# Patient Record
Sex: MALE | Race: WHITE | NOT HISPANIC OR LATINO | Employment: OTHER | URBAN - METROPOLITAN AREA
[De-identification: names, ages, dates, MRNs, and addresses within clinical notes are randomized per-mention and may not be internally consistent; named-entity substitution may affect disease eponyms.]

---

## 2020-02-07 DIAGNOSIS — M25.531 PAIN IN RIGHT WRIST: Primary | ICD-10-CM

## 2020-07-21 ENCOUNTER — HOSPITAL ENCOUNTER (OUTPATIENT)
Facility: HOSPITAL | Age: 20
Discharge: HOME | End: 2020-07-21
Attending: EMERGENCY MEDICINE | Admitting: UROLOGY
Payer: OTHER GOVERNMENT

## 2020-07-21 ENCOUNTER — ANESTHESIA (OUTPATIENT)
Dept: SURGERY | Facility: HOSPITAL | Age: 20
End: 2020-07-21
Payer: OTHER GOVERNMENT

## 2020-07-21 ENCOUNTER — ANESTHESIA EVENT (OUTPATIENT)
Dept: SURGERY | Facility: HOSPITAL | Age: 20
End: 2020-07-21
Payer: OTHER GOVERNMENT

## 2020-07-21 VITALS
TEMPERATURE: 99 F | HEART RATE: 66 BPM | BODY MASS INDEX: 26.66 KG/M2 | HEIGHT: 69 IN | RESPIRATION RATE: 16 BRPM | SYSTOLIC BLOOD PRESSURE: 139 MMHG | WEIGHT: 180 LBS | DIASTOLIC BLOOD PRESSURE: 78 MMHG | OXYGEN SATURATION: 100 %

## 2020-07-21 DIAGNOSIS — N44.00 TESTICULAR TORSION: ICD-10-CM

## 2020-07-21 DIAGNOSIS — N44.00 LEFT TESTICULAR TORSION: Primary | ICD-10-CM

## 2020-07-21 DIAGNOSIS — R07.9 CHEST PAIN: ICD-10-CM

## 2020-07-21 DIAGNOSIS — N50.812 TESTICULAR PAIN, LEFT: ICD-10-CM

## 2020-07-21 LAB
ABO + RH BLD: NORMAL
ALBUMIN SERPL BCP-MCNC: 4.6 G/DL (ref 3.5–5.2)
ALP SERPL-CCNC: 77 U/L (ref 55–135)
ALT SERPL W/O P-5'-P-CCNC: 102 U/L (ref 10–44)
ANION GAP SERPL CALC-SCNC: 15 MMOL/L (ref 8–16)
APTT BLDCRRT: 24.8 SEC (ref 21–32)
AST SERPL-CCNC: 54 U/L (ref 10–40)
BASOPHILS # BLD AUTO: 0.03 K/UL (ref 0–0.2)
BASOPHILS NFR BLD: 0 % (ref 0–1.9)
BASOPHILS NFR BLD: 0.3 % (ref 0–1.9)
BILIRUB SERPL-MCNC: 1 MG/DL (ref 0.1–1)
BLD GP AB SCN CELLS X3 SERPL QL: NORMAL
BUN SERPL-MCNC: 17 MG/DL (ref 6–20)
CALCIUM SERPL-MCNC: 10.1 MG/DL (ref 8.7–10.5)
CHLORIDE SERPL-SCNC: 106 MMOL/L (ref 95–110)
CO2 SERPL-SCNC: 17 MMOL/L (ref 23–29)
CREAT SERPL-MCNC: 1.1 MG/DL (ref 0.5–1.4)
DIFFERENTIAL METHOD: ABNORMAL
DIFFERENTIAL METHOD: ABNORMAL
EOSINOPHIL # BLD AUTO: 0 K/UL (ref 0–0.5)
EOSINOPHIL NFR BLD: 0.2 % (ref 0–8)
EOSINOPHIL NFR BLD: 100 % (ref 0–8)
ERYTHROCYTE [DISTWIDTH] IN BLOOD BY AUTOMATED COUNT: 11.9 % (ref 11.5–14.5)
ERYTHROCYTE [DISTWIDTH] IN BLOOD BY AUTOMATED COUNT: 11.9 % (ref 11.5–14.5)
EST. GFR  (AFRICAN AMERICAN): >60 ML/MIN/1.73 M^2
EST. GFR  (NON AFRICAN AMERICAN): >60 ML/MIN/1.73 M^2
GLUCOSE SERPL-MCNC: 110 MG/DL (ref 70–110)
HCT VFR BLD AUTO: 43.6 % (ref 40–54)
HCT VFR BLD AUTO: 45 % (ref 40–54)
HGB BLD-MCNC: 15.3 G/DL (ref 14–18)
HGB BLD-MCNC: 15.6 G/DL (ref 14–18)
IMM GRANULOCYTES # BLD AUTO: 0.04 K/UL (ref 0–0.04)
IMM GRANULOCYTES # BLD AUTO: ABNORMAL K/UL (ref 0–0.04)
IMM GRANULOCYTES NFR BLD AUTO: 0.3 % (ref 0–0.5)
IMM GRANULOCYTES NFR BLD AUTO: ABNORMAL % (ref 0–0.5)
INR PPP: 1 (ref 0.8–1.2)
LACTATE SERPL-SCNC: 1.1 MMOL/L (ref 0.5–2.2)
LIPASE SERPL-CCNC: 12 U/L (ref 4–60)
LYMPHOCYTES # BLD AUTO: 0.8 K/UL (ref 1–4.8)
LYMPHOCYTES NFR BLD: 0 % (ref 18–48)
LYMPHOCYTES NFR BLD: 6.8 % (ref 18–48)
MCH RBC QN AUTO: 31.6 PG (ref 27–31)
MCH RBC QN AUTO: 31.9 PG (ref 27–31)
MCHC RBC AUTO-ENTMCNC: 34.7 G/DL (ref 32–36)
MCHC RBC AUTO-ENTMCNC: 35.1 G/DL (ref 32–36)
MCV RBC AUTO: 91 FL (ref 82–98)
MCV RBC AUTO: 91 FL (ref 82–98)
MONOCYTES # BLD AUTO: 0.8 K/UL (ref 0.3–1)
MONOCYTES NFR BLD: 0 % (ref 4–15)
MONOCYTES NFR BLD: 6.9 % (ref 4–15)
NEUTROPHILS # BLD AUTO: 10.1 K/UL (ref 1.8–7.7)
NEUTROPHILS NFR BLD: 0 % (ref 38–73)
NEUTROPHILS NFR BLD: 85.5 % (ref 38–73)
NRBC BLD-RTO: 0 /100 WBC
NRBC BLD-RTO: 0 /100 WBC
PLATELET # BLD AUTO: 235 K/UL (ref 150–350)
PLATELET # BLD AUTO: ABNORMAL K/UL (ref 150–350)
PMV BLD AUTO: 10.5 FL (ref 9.2–12.9)
PMV BLD AUTO: ABNORMAL FL (ref 9.2–12.9)
POTASSIUM SERPL-SCNC: 3.4 MMOL/L (ref 3.5–5.1)
PROT SERPL-MCNC: 7.9 G/DL (ref 6–8.4)
PROTHROMBIN TIME: 10.7 SEC (ref 9–12.5)
RBC # BLD AUTO: 4.8 M/UL (ref 4.6–6.2)
RBC # BLD AUTO: 4.93 M/UL (ref 4.6–6.2)
RH BLD: NORMAL
SARS-COV-2 RDRP RESP QL NAA+PROBE: NEGATIVE
SODIUM SERPL-SCNC: 138 MMOL/L (ref 136–145)
WBC # BLD AUTO: 11.81 K/UL (ref 3.9–12.7)
WBC # BLD AUTO: 7.37 K/UL (ref 3.9–12.7)

## 2020-07-21 PROCEDURE — 63600175 PHARM REV CODE 636 W HCPCS: Performed by: ANESTHESIOLOGY

## 2020-07-21 PROCEDURE — 86850 RBC ANTIBODY SCREEN: CPT

## 2020-07-21 PROCEDURE — 96375 TX/PRO/DX INJ NEW DRUG ADDON: CPT

## 2020-07-21 PROCEDURE — 00930 ANES PX MALE GENT ORCHIOPEXY: CPT | Performed by: UROLOGY

## 2020-07-21 PROCEDURE — 25000003 PHARM REV CODE 250: Performed by: NURSE ANESTHETIST, CERTIFIED REGISTERED

## 2020-07-21 PROCEDURE — 54620 SUSPENSION OF TESTIS: CPT | Mod: 50,,, | Performed by: UROLOGY

## 2020-07-21 PROCEDURE — 36000706: Performed by: UROLOGY

## 2020-07-21 PROCEDURE — 80053 COMPREHEN METABOLIC PANEL: CPT

## 2020-07-21 PROCEDURE — D9220A PRA ANESTHESIA: Mod: ANES,,, | Performed by: ANESTHESIOLOGY

## 2020-07-21 PROCEDURE — D9220A PRA ANESTHESIA: ICD-10-PCS | Mod: ANES,,, | Performed by: ANESTHESIOLOGY

## 2020-07-21 PROCEDURE — 96376 TX/PRO/DX INJ SAME DRUG ADON: CPT

## 2020-07-21 PROCEDURE — 25000003 PHARM REV CODE 250: Performed by: PHYSICIAN ASSISTANT

## 2020-07-21 PROCEDURE — 87040 BLOOD CULTURE FOR BACTERIA: CPT | Mod: 59

## 2020-07-21 PROCEDURE — U0002 COVID-19 LAB TEST NON-CDC: HCPCS

## 2020-07-21 PROCEDURE — 99285 EMERGENCY DEPT VISIT HI MDM: CPT | Mod: ,,, | Performed by: UROLOGY

## 2020-07-21 PROCEDURE — 25000003 PHARM REV CODE 250: Performed by: UROLOGY

## 2020-07-21 PROCEDURE — 54620 PR FIXATN OF TESTIS OPP TORSN: ICD-10-PCS | Mod: 50,,, | Performed by: UROLOGY

## 2020-07-21 PROCEDURE — 36000707: Performed by: UROLOGY

## 2020-07-21 PROCEDURE — 83690 ASSAY OF LIPASE: CPT

## 2020-07-21 PROCEDURE — 85730 THROMBOPLASTIN TIME PARTIAL: CPT

## 2020-07-21 PROCEDURE — 83605 ASSAY OF LACTIC ACID: CPT

## 2020-07-21 PROCEDURE — 71000016 HC POSTOP RECOV ADDL HR: Performed by: UROLOGY

## 2020-07-21 PROCEDURE — 37000008 HC ANESTHESIA 1ST 15 MINUTES: Performed by: UROLOGY

## 2020-07-21 PROCEDURE — 71000033 HC RECOVERY, INTIAL HOUR: Performed by: UROLOGY

## 2020-07-21 PROCEDURE — 99285 EMERGENCY DEPT VISIT HI MDM: CPT | Mod: 25

## 2020-07-21 PROCEDURE — 86901 BLOOD TYPING SEROLOGIC RH(D): CPT

## 2020-07-21 PROCEDURE — 63600175 PHARM REV CODE 636 W HCPCS: Performed by: UROLOGY

## 2020-07-21 PROCEDURE — 85025 COMPLETE CBC W/AUTO DIFF WBC: CPT | Mod: 91

## 2020-07-21 PROCEDURE — 96374 THER/PROPH/DIAG INJ IV PUSH: CPT

## 2020-07-21 PROCEDURE — 71000015 HC POSTOP RECOV 1ST HR: Performed by: UROLOGY

## 2020-07-21 PROCEDURE — 85610 PROTHROMBIN TIME: CPT

## 2020-07-21 PROCEDURE — 63600175 PHARM REV CODE 636 W HCPCS: Performed by: NURSE ANESTHETIST, CERTIFIED REGISTERED

## 2020-07-21 PROCEDURE — 37000009 HC ANESTHESIA EA ADD 15 MINS: Performed by: UROLOGY

## 2020-07-21 PROCEDURE — 99284 EMERGENCY DEPT VISIT MOD MDM: CPT | Mod: 25

## 2020-07-21 PROCEDURE — 63600175 PHARM REV CODE 636 W HCPCS: Performed by: PHYSICIAN ASSISTANT

## 2020-07-21 PROCEDURE — D9220A PRA ANESTHESIA: ICD-10-PCS | Mod: CRNA,,, | Performed by: NURSE ANESTHETIST, CERTIFIED REGISTERED

## 2020-07-21 PROCEDURE — 99285 PR EMERGENCY DEPT VISIT,LEVEL V: ICD-10-PCS | Mod: ,,, | Performed by: UROLOGY

## 2020-07-21 PROCEDURE — D9220A PRA ANESTHESIA: Mod: CRNA,,, | Performed by: NURSE ANESTHETIST, CERTIFIED REGISTERED

## 2020-07-21 RX ORDER — HYDROCODONE BITARTRATE AND ACETAMINOPHEN 10; 325 MG/1; MG/1
1 TABLET ORAL EVERY 4 HOURS PRN
Status: DISCONTINUED | OUTPATIENT
Start: 2020-07-21 | End: 2020-07-21 | Stop reason: HOSPADM

## 2020-07-21 RX ORDER — HYDROCODONE BITARTRATE AND ACETAMINOPHEN 5; 325 MG/1; MG/1
1 TABLET ORAL EVERY 4 HOURS PRN
Status: DISCONTINUED | OUTPATIENT
Start: 2020-07-21 | End: 2020-07-21 | Stop reason: HOSPADM

## 2020-07-21 RX ORDER — SODIUM CHLORIDE, SODIUM LACTATE, POTASSIUM CHLORIDE, CALCIUM CHLORIDE 600; 310; 30; 20 MG/100ML; MG/100ML; MG/100ML; MG/100ML
INJECTION, SOLUTION INTRAVENOUS CONTINUOUS PRN
Status: DISCONTINUED | OUTPATIENT
Start: 2020-07-21 | End: 2020-07-21

## 2020-07-21 RX ORDER — HYDROMORPHONE HYDROCHLORIDE 2 MG/ML
0.2 INJECTION, SOLUTION INTRAMUSCULAR; INTRAVENOUS; SUBCUTANEOUS EVERY 5 MIN PRN
Status: DISCONTINUED | OUTPATIENT
Start: 2020-07-21 | End: 2020-07-21 | Stop reason: HOSPADM

## 2020-07-21 RX ORDER — MORPHINE SULFATE 10 MG/ML
4 INJECTION INTRAMUSCULAR; INTRAVENOUS; SUBCUTANEOUS
Status: COMPLETED | OUTPATIENT
Start: 2020-07-21 | End: 2020-07-21

## 2020-07-21 RX ORDER — HYDROCODONE BITARTRATE AND ACETAMINOPHEN 5; 325 MG/1; MG/1
1 TABLET ORAL EVERY 6 HOURS PRN
Qty: 28 TABLET | Refills: 0 | Status: SHIPPED | OUTPATIENT
Start: 2020-07-21

## 2020-07-21 RX ORDER — SODIUM CHLORIDE, SODIUM LACTATE, POTASSIUM CHLORIDE, CALCIUM CHLORIDE 600; 310; 30; 20 MG/100ML; MG/100ML; MG/100ML; MG/100ML
INJECTION, SOLUTION INTRAVENOUS CONTINUOUS
Status: DISCONTINUED | OUTPATIENT
Start: 2020-07-21 | End: 2020-07-21 | Stop reason: HOSPADM

## 2020-07-21 RX ORDER — PROPOFOL 10 MG/ML
VIAL (ML) INTRAVENOUS
Status: DISCONTINUED | OUTPATIENT
Start: 2020-07-21 | End: 2020-07-21

## 2020-07-21 RX ORDER — SODIUM CHLORIDE 0.9 % (FLUSH) 0.9 %
10 SYRINGE (ML) INJECTION
Status: DISCONTINUED | OUTPATIENT
Start: 2020-07-21 | End: 2020-07-21 | Stop reason: HOSPADM

## 2020-07-21 RX ORDER — LIDOCAINE HCL/PF 100 MG/5ML
SYRINGE (ML) INTRAVENOUS
Status: DISCONTINUED | OUTPATIENT
Start: 2020-07-21 | End: 2020-07-21

## 2020-07-21 RX ORDER — ONDANSETRON 2 MG/ML
4 INJECTION INTRAMUSCULAR; INTRAVENOUS
Status: COMPLETED | OUTPATIENT
Start: 2020-07-21 | End: 2020-07-21

## 2020-07-21 RX ORDER — CEFAZOLIN SODIUM 1 G/50ML
1 SOLUTION INTRAVENOUS
Status: DISCONTINUED | OUTPATIENT
Start: 2020-07-21 | End: 2020-07-21 | Stop reason: HOSPADM

## 2020-07-21 RX ORDER — MIDAZOLAM HYDROCHLORIDE 1 MG/ML
INJECTION, SOLUTION INTRAMUSCULAR; INTRAVENOUS
Status: DISCONTINUED | OUTPATIENT
Start: 2020-07-21 | End: 2020-07-21

## 2020-07-21 RX ORDER — MORPHINE SULFATE 10 MG/ML
2 INJECTION INTRAMUSCULAR; INTRAVENOUS; SUBCUTANEOUS
Status: COMPLETED | OUTPATIENT
Start: 2020-07-21 | End: 2020-07-21

## 2020-07-21 RX ORDER — CEPHALEXIN 500 MG/1
500 CAPSULE ORAL EVERY 8 HOURS
Qty: 15 CAPSULE | Refills: 0 | Status: SHIPPED | OUTPATIENT
Start: 2020-07-21 | End: 2020-07-26

## 2020-07-21 RX ORDER — ONDANSETRON 2 MG/ML
INJECTION INTRAMUSCULAR; INTRAVENOUS
Status: DISCONTINUED | OUTPATIENT
Start: 2020-07-21 | End: 2020-07-21

## 2020-07-21 RX ORDER — ACETAMINOPHEN 10 MG/ML
1000 INJECTION, SOLUTION INTRAVENOUS ONCE
Status: COMPLETED | OUTPATIENT
Start: 2020-07-21 | End: 2020-07-21

## 2020-07-21 RX ORDER — SUCCINYLCHOLINE CHLORIDE 20 MG/ML
INJECTION INTRAMUSCULAR; INTRAVENOUS
Status: DISCONTINUED | OUTPATIENT
Start: 2020-07-21 | End: 2020-07-21

## 2020-07-21 RX ORDER — KETOROLAC TROMETHAMINE 30 MG/ML
15 INJECTION, SOLUTION INTRAMUSCULAR; INTRAVENOUS
Status: COMPLETED | OUTPATIENT
Start: 2020-07-21 | End: 2020-07-21

## 2020-07-21 RX ORDER — LIDOCAINE HYDROCHLORIDE 10 MG/ML
INJECTION, SOLUTION EPIDURAL; INFILTRATION; INTRACAUDAL; PERINEURAL
Status: DISCONTINUED | OUTPATIENT
Start: 2020-07-21 | End: 2020-07-21 | Stop reason: HOSPADM

## 2020-07-21 RX ORDER — BUPIVACAINE HYDROCHLORIDE 2.5 MG/ML
INJECTION, SOLUTION EPIDURAL; INFILTRATION; INTRACAUDAL
Status: DISCONTINUED | OUTPATIENT
Start: 2020-07-21 | End: 2020-07-21 | Stop reason: HOSPADM

## 2020-07-21 RX ORDER — PHENAZOPYRIDINE HYDROCHLORIDE 100 MG/1
200 TABLET, FILM COATED ORAL ONCE
Status: COMPLETED | OUTPATIENT
Start: 2020-07-21 | End: 2020-07-21

## 2020-07-21 RX ORDER — FENTANYL CITRATE 50 UG/ML
INJECTION, SOLUTION INTRAMUSCULAR; INTRAVENOUS
Status: DISCONTINUED | OUTPATIENT
Start: 2020-07-21 | End: 2020-07-21

## 2020-07-21 RX ADMIN — HYDROMORPHONE HYDROCHLORIDE 0.2 MG: 2 INJECTION, SOLUTION INTRAMUSCULAR; INTRAVENOUS; SUBCUTANEOUS at 11:07

## 2020-07-21 RX ADMIN — HYDROCODONE BITARTRATE AND ACETAMINOPHEN 1 TABLET: 5; 325 TABLET ORAL at 11:07

## 2020-07-21 RX ADMIN — MIDAZOLAM HYDROCHLORIDE 2 MG: 1 INJECTION, SOLUTION INTRAMUSCULAR; INTRAVENOUS at 09:07

## 2020-07-21 RX ADMIN — MORPHINE SULFATE 4 MG: 10 INJECTION INTRAVENOUS at 07:07

## 2020-07-21 RX ADMIN — ACETAMINOPHEN 1000 MG: 10 INJECTION, SOLUTION INTRAVENOUS at 10:07

## 2020-07-21 RX ADMIN — MORPHINE SULFATE 2 MG: 10 INJECTION INTRAVENOUS at 08:07

## 2020-07-21 RX ADMIN — PHENAZOPYRIDINE HYDROCHLORIDE 200 MG: 100 TABLET ORAL at 10:07

## 2020-07-21 RX ADMIN — FENTANYL CITRATE 50 MCG: 50 INJECTION INTRAMUSCULAR; INTRAVENOUS at 09:07

## 2020-07-21 RX ADMIN — PROPOFOL 150 MG: 10 INJECTION, EMULSION INTRAVENOUS at 09:07

## 2020-07-21 RX ADMIN — SUCCINYLCHOLINE CHLORIDE 140 MG: 20 INJECTION, SOLUTION INTRAMUSCULAR; INTRAVENOUS at 09:07

## 2020-07-21 RX ADMIN — CEFAZOLIN SODIUM 2 G: 1 SOLUTION INTRAVENOUS at 09:07

## 2020-07-21 RX ADMIN — PROPOFOL 50 MG: 10 INJECTION, EMULSION INTRAVENOUS at 09:07

## 2020-07-21 RX ADMIN — SODIUM CHLORIDE 1000 ML: 0.9 INJECTION, SOLUTION INTRAVENOUS at 07:07

## 2020-07-21 RX ADMIN — ONDANSETRON 4 MG: 2 INJECTION, SOLUTION INTRAMUSCULAR; INTRAVENOUS at 10:07

## 2020-07-21 RX ADMIN — KETOROLAC TROMETHAMINE 15 MG: 30 INJECTION, SOLUTION INTRAMUSCULAR at 07:07

## 2020-07-21 RX ADMIN — SODIUM CHLORIDE, SODIUM LACTATE, POTASSIUM CHLORIDE, AND CALCIUM CHLORIDE: .6; .31; .03; .02 INJECTION, SOLUTION INTRAVENOUS at 09:07

## 2020-07-21 RX ADMIN — ONDANSETRON HYDROCHLORIDE 4 MG: 2 SOLUTION INTRAMUSCULAR; INTRAVENOUS at 07:07

## 2020-07-21 RX ADMIN — LIDOCAINE HYDROCHLORIDE 100 MG: 20 INJECTION, SOLUTION INTRAVENOUS at 09:07

## 2020-07-21 NOTE — HPI
Left Testicular Torsion  Vitor Douglas is a 20 y.o. male who woke up this morning at 0450 with left testicular pain.  He denies trauma, dysuria, hematuria, or previous torsion.  The pain has not improved since onset.

## 2020-07-21 NOTE — BRIEF OP NOTE
[facility]  Brief Operative Note    SUMMARY     Surgery Date: 7/21/2020     Surgeon(s) and Role:     * KAITY Guillermo MD - Primary    Assisting Surgeon: None    Pre-op Diagnosis:  Torsion of left testicle [N44.00]    Post-op Diagnosis:  Post-Op Diagnosis Codes:     * Torsion of left testicle [N44.00]    Procedure(s) (LRB):  REDUCTION, TORSION, TESTICLE (Left)  ORCHIOPEXY (Bilateral)  EXPLORATION, SCROTUM (Bilateral)    Anesthesia: General    Description of Procedure: 360 degree torsion of left testicle, testicle viable after detorsion    Description of the findings of the procedure: bilateral orchiopexy    Estimated Blood Loss: * No values recorded between 7/21/2020  9:36 AM and 7/21/2020 10:21 AM *         Specimens:   Specimen (12h ago, onward)    None

## 2020-07-21 NOTE — ANESTHESIA PREPROCEDURE EVALUATION
"                                                                                                             07/21/2020  Viotr Douglas is a 20 y.o., male.  Pre-operative evaluation for Procedure(s) (LRB):  REDUCTION, TORSION, TESTICLE (Left)    NPO >8h  METS >4    Vitals:    07/21/20 0545 07/21/20 0707   BP: (!) 159/88    BP Location: Right arm    Patient Position: Sitting    Pulse: 77    Resp: (!) 22 20   Temp: 36.9 °C (98.5 °F)    TempSrc: Oral    SpO2: 99%    Weight: 81.6 kg (180 lb)    Height: 5' 9" (1.753 m)        Review of patient's allergies indicates:  No Known Allergies    No current facility-administered medications on file prior to encounter.      No current outpatient medications on file prior to encounter.       History reviewed. No pertinent surgical history.    Social History     Socioeconomic History    Marital status: Single     Spouse name: Not on file    Number of children: Not on file    Years of education: Not on file    Highest education level: Not on file   Occupational History    Not on file   Social Needs    Financial resource strain: Not on file    Food insecurity     Worry: Not on file     Inability: Not on file    Transportation needs     Medical: Not on file     Non-medical: Not on file   Tobacco Use    Smoking status: Current Every Day Smoker     Types: Vaping with nicotine   Substance and Sexual Activity    Alcohol use: Never     Frequency: Never    Drug use: Never    Sexual activity: Not Currently   Lifestyle    Physical activity     Days per week: Not on file     Minutes per session: Not on file    Stress: Not on file   Relationships    Social connections     Talks on phone: Not on file     Gets together: Not on file     Attends Worship service: Not on file     Active member of club or organization: Not on file     Attends meetings of clubs or organizations: Not on file     Relationship status: Not on file   Other Topics Concern    Not on file   Social " History Narrative    Not on file         CBC:   Recent Labs     20  0626   WBC 7.37   RBC 4.93   HGB 15.6   HCT 45.0   PLT SEE COMMENT   MCV 91   MCH 31.6*   MCHC 34.7       CMP:   Recent Labs     20  0626      K 3.4*      CO2 17*   BUN 17   CREATININE 1.1      CALCIUM 10.1   ALBUMIN 4.6   PROT 7.9   ALKPHOS 77   *   AST 54*   BILITOT 1.0       INR  No results for input(s): PT, INR, PROTIME, APTT in the last 72 hours.        Diagnostic Studies:      EKD Echo:  No results found for this or any previous visit.      Anesthesia Evaluation    I have reviewed the Patient Summary Reports.   I have reviewed the NPO Status.   I have reviewed the Medications.     Review of Systems  Anesthesia Hx:  No previous Anesthesia  Neg history of prior surgery.  Denies Personal Hx of Anesthesia complications.   Social:  Smoker    Hematology/Oncology:  Hematology Normal   Oncology Normal     EENT/Dental:EENT/Dental Normal   Cardiovascular:  Cardiovascular Normal Exercise tolerance: good     Pulmonary:  Pulmonary Normal    Renal/:   Left torsion   Hepatic/GI:  Hepatic/GI Normal    Neurological:  Neurology Normal    Endocrine:  Endocrine Normal        Physical Exam  General:  Well nourished     Eyes/Ears/Nose:  EYES/EARS/NOSE FINDINGS: Normal         Mental Status:  Mental Status Findings: Normal        Anesthesia Plan  Type of Anesthesia, risks & benefits discussed:  Anesthesia Type:  general  Patient's Preference:   Intra-op Monitoring Plan: standard ASA monitors  Intra-op Monitoring Plan Comments:   Post Op Pain Control Plan: multimodal analgesia  Post Op Pain Control Plan Comments:   Induction:   IV and rapid sequence  Beta Blocker:  Patient is not currently on a Beta-Blocker (No further documentation required).       Informed Consent: Patient understands risks and agrees with Anesthesia plan.  Questions answered. Anesthesia consent signed with patient.  ASA Score: 2  emergent   Day of  Surgery Review of History & Physical:  There are no significant changes.          Ready For Surgery From Anesthesia Perspective.

## 2020-07-21 NOTE — ED NOTES
Patient instructed on urine specimen collection and verbalized understanding of instructions, but unable to urinate at this time.

## 2020-07-21 NOTE — ASSESSMENT & PLAN NOTE
Consented and marked on the left side for scrotal exploration and bilateral orchiopexy with possible left orchiectomy.  Keep NPO  Cefazolin on call

## 2020-07-21 NOTE — DISCHARGE SUMMARY
[facility]  Urology  Discharge Summary      Patient Name: Vitor Douglas   MRN: [id number]  Admission Date: 07/21/2020   Hospital Length of Stay: 0 days  Discharge Date and Time:  07/21/2020 10:22 AM  Attending Physician: KAITY Guillermo MD   Discharging Provider: ARTIE Guillermo MD  Primary Care Physician: Eliseo Aguilar      HPI: Patient was admitted for an outpatient procedure and tolerated the procedure well with no complications.     Procedures: Procedure(s):  REDUCTION, TORSION, TESTICLE  ORCHIOPEXY  EXPLORATION, SCROTUM        Indwelling Lines/Drains at time of discharge:           Hospital Course (synopsis of major diagnoses, care, treatment, and services provided during the course of the hospital stay): Patient was admitted for an outpatient procedure and tolerated the procedure well with no complications.         Final Active Diagnoses:    Diagnosis Date Noted POA    Left testicular torsion   07/21/2020  Yes      Problems Resolved During this Admission:       Discharged Condition: stable    Disposition: Home or Self Care    Follow Up:     Patient Instructions:      Diet general     Call MD for:   Order Comments: Significant Hematuria     Medications:  Reconciled Home Medications:      Medication List      START taking these medications    cephALEXin 500 MG capsule  Commonly known as: KEFLEX  Take 1 capsule (500 mg total) by mouth every 8 (eight) hours. for 5 days     HYDROcodone-acetaminophen 5-325 mg per tablet  Commonly known as: NORCO  Take 1 tablet by mouth every 6 (six) hours as needed for Pain.              ARTIE Guillermo MD  Urology  [facility]

## 2020-07-21 NOTE — DISCHARGE INSTRUCTIONS
Discharge Instructions    Activity  Do not worry if you feel more tired than usual.   Listen to your body. If an activity causes pain, stop.  Avoid strenuous activities, such as mowing the lawn, vacuuming, or playing sports for 2-4 weeks following surgery.  Do not drive until you are off your pain medication and are pain-free.   Do not lift anything heavier than 10 pounds for 4 weeks.  Avoid sexual activity for 2-4 weeks.      Home care  Shower as needed. But dont swim or use a bathtub or hot tub until after your follow-up appointment.  Keep your incision clean and dry. You may wash your incision gently with mild soap and warm water when necessary.  Wear an athletic supporter (also called a jockstrap) for the first few days. And wear supportive briefs rather than boxer shorts.  Eat a healthy, well-balanced diet.  Drink 6-8 glasses of water a day unless instructed otherwise by your health care provider.  Eat high-fiber foods to avoid constipation. Also, use laxatives, stool softeners, or enemas as directed by your health care provider.  Finish all of the antibiotics your doctor prescribed to you, even if you feel better. Antibiotics help keep you from getting an infection.  Follow your health care providers instructions for taking any pain medication.      Follow-up  Make a follow-up appointment as directed by your health care provider    When to call your health care provider  Call your health care provider right away if you have any of the following:  Fever of 100.4°F (38.0°C) or higher, or shaking chills  Redness, swelling, warmth, or pain at your incision site  Drainage, pus, or bleeding from your incision  Incision that opens up or pulls apart       No driving, drinking alcohol, operating machinery or appliances, or participating in activities that require good judgement or balance, and no signing legal documents for the next 24 hours after anesthesia.  Do not drive while taking pain  medications.           Fall Prevention  Millions of people fall every year and injure themselves. You may have had anesthesia or sedation which may increase your risk of falling. You may have health issues that put you at an increased risk of falling.     Here are ways to reduce your risk of falling.    Make your home safe by keeping walkways clear of objects you may trip over.  Use non-slip pads under rugs. Do not use area rugs or small throw rugs.  Use non-slip mats in bathtubs and showers.  Install handrails and lights on staircases.  Do not walk in poorly lit areas.  Do not stand on chairs or wobbly ladders.  Use caution when reaching overhead or looking upward. This position can cause a loss of balance.  Be sure your shoes fit properly, have non-slip bottoms and are in good condition.   Wear shoes both inside and out. Avoid going barefoot or wearing slippers.  Be cautious when going up and down stairs, curbs, and when walking on uneven sidewalks.  If your balance is poor, consider using a cane or walker.  If your fall was related to alcohol use, stop or limit alcohol intake.   If your fall was related to use of sleeping medicines, talk to your doctor about this. You may need to reduce your dosage at bedtime if you awaken during the night to go to the bathroom.    To reduce the need for nighttime bathroom trips:  Avoid drinking fluids for several hours before going to bed  Empty your bladder before going to bed  Men can keep a urinal at the bedside  Stay as active as you can. Balance, flexibility, strength, and endurance all come from exercise. They all play a role in preventing falls. Ask your healthcare provider which types of activity are right for you.  Get your vision checked on a regular basis.  If you have pets, know where they are before you stand up or walk so you don't trip over them.  Use night lights.

## 2020-07-21 NOTE — SUBJECTIVE & OBJECTIVE
History reviewed. No pertinent past medical history.    History reviewed. No pertinent surgical history.    Review of patient's allergies indicates:  No Known Allergies    Family History     None          Tobacco Use    Smoking status: Current Every Day Smoker     Types: Vaping with nicotine   Substance and Sexual Activity    Alcohol use: Never     Frequency: Never    Drug use: Never    Sexual activity: Not Currently       Review of Systems   Constitutional: Positive for fever.   HENT: Negative.    Eyes: Negative.    Respiratory: Negative for cough, chest tightness and shortness of breath.    Cardiovascular: Negative for chest pain.   Gastrointestinal: Negative.  Negative for constipation, diarrhea and nausea.   Genitourinary: Positive for testicular pain. Negative for scrotal swelling.   Musculoskeletal: Negative.    Neurological: Negative.    Psychiatric/Behavioral: Negative.        Objective:     Temp:  [98.5 °F (36.9 °C)] 98.5 °F (36.9 °C)  Pulse:  [77] 77  Resp:  [16-22] 16  SpO2:  [99 %] 99 %  BP: (159)/(88) 159/88     Body mass index is 26.58 kg/m².    No intake/output data recorded.       Drains     None                 Physical Exam   Nursing note and vitals reviewed.  Constitutional: He is oriented to person, place, and time. He appears well-developed.   HENT:   Head: Normocephalic.   Eyes: Conjunctivae are normal.   Neck: Normal range of motion. Neck supple. No tracheal deviation present. No thyromegaly present.   Cardiovascular: Normal rate and normal heart sounds.    Pulmonary/Chest: Effort normal and breath sounds normal. No respiratory distress. He has no wheezes.   Abdominal: Soft. Bowel sounds are normal. There is no abdominal tenderness. There is no rebound. No hernia. Hernia confirmed negative in the right inguinal area and confirmed negative in the left inguinal area.   Genitourinary: Right testis shows no mass, no swelling and no tenderness. Right testis is descended. Cremasteric reflex is  not absent on the right side. Left testis shows tenderness. Left testis shows no mass and no swelling. Left testis is descended. Cremasteric reflex is absent on the left side. Uncircumcised.   Musculoskeletal: Normal range of motion. No tenderness.   Lymphadenopathy:     He has no cervical adenopathy.   Neurological: He is alert and oriented to person, place, and time.   Skin: Skin is warm and dry. No rash noted. No erythema.     Psychiatric: His behavior is normal. Judgment and thought content normal.       Significant Labs:    BMP:  Recent Labs   Lab 07/21/20  0626      K 3.4*      CO2 17*   BUN 17   CREATININE 1.1   CALCIUM 10.1       CBC:  Recent Labs   Lab 07/21/20 0626   WBC 7.37   HGB 15.6   HCT 45.0   PLT SEE COMMENT       Blood Culture: No results for input(s): LABBLOO in the last 168 hours.  Urine Culture: No results for input(s): LABURIN in the last 168 hours.    Significant Imaging:  U/S: I have reviewed all results within the past 24 hours and my personal findings are:      Contains abnormal data US Scrotum And Testicles  Order: [id number]  Status:  Final result   Visible to patient:  No (not released)   Next appt:  None   Dx:  Testicular pain, left  Details    Reading Physician Reading Date Result Priority   Swapnil Pacheco MD  [phone] 7/21/2020 STAT      Narrative & Impression     EXAMINATION:  US SCROTUM AND TESTICLES     CLINICAL HISTORY:  Left testicular pain     TECHNIQUE:  Sonography of the scrotum and testes.     COMPARISON:  None.     FINDINGS:  The right testicle measures approximately 5.4 x 2.6 x 2.8 cm in size, it demonstrates appropriate echotexture without evidence for testicular mass or cystic collection.  On color Doppler imaging the right testicle demonstrates sonographic evidence for appropriate flow.  Mild peritesticular fluid on the right noted may be physiologic may relate to mild hydrocele.  The right epididymis appears unremarkable.     The left testicle  measures approximately 3.0 x 2.9 x 5.4 cm in size it is relatively homogeneous and there is no left testicular mass or cystic lesion however on color Doppler imaging there is no sonographically detectable vascular flow of the left testicle most concerning for testicular torsion.  Mild peritesticular fluid on the left is noted, this may represent hydrocele.  Mild prominence of the epididymis noted.     Impression:     There is no sonographically detectable flow of the left testicle this is consistent with testicular torsion of the left testicle.     Findings were reported to the patient's nurse in the ER at the time of dictation.     This report was flagged in Epic as abnormal.        Electronically signed by: Swapnil Pacheco  Date:                                            07/21/2020  Time:                                           08:04            Last Resulted: 07/21/20 08:04

## 2020-07-21 NOTE — ANESTHESIA POSTPROCEDURE EVALUATION
Anesthesia Post Evaluation    Patient: Vitor Douglas    Procedure(s) Performed: Procedure(s) (LRB):  REDUCTION, TORSION, TESTICLE (Left)  ORCHIOPEXY (Bilateral)  EXPLORATION, SCROTUM (Bilateral)    Final Anesthesia Type: general    Patient location during evaluation: PACU  Patient participation: Yes- Able to Participate  Level of consciousness: awake and alert  Post-procedure vital signs: reviewed and stable  Pain management: adequate  Airway patency: patent  GÉNESIS mitigation strategies: Multimodal analgesia and Extubation while patient is awake  PONV status at discharge: No PONV  Anesthetic complications: no      Cardiovascular status: blood pressure returned to baseline  Respiratory status: unassisted and spontaneous ventilation  Hydration status: euvolemic  Follow-up not needed.          Vitals Value Taken Time   /78 07/21/20 1230   Temp 36.9 °C (98.5 °F) 07/21/20 1230   Pulse 66 07/21/20 1230   Resp 16 07/21/20 1230   SpO2 100 % 07/21/20 1230         Event Time   Out of Recovery 11:24:45         Pain/Vincent Score: Pain Rating Prior to Med Admin: 5 (7/21/2020 12:30 PM)  Pain Rating Post Med Admin: 3 (7/21/2020 12:30 PM)  Vincent Score: 10 (7/21/2020 11:25 AM)  Modified Vincent Score: 19 (7/21/2020 12:31 PM)

## 2020-07-21 NOTE — ED PROVIDER NOTES
Encounter Date: 7/21/2020    SCRIBE #1 NOTE: I, Brittani Gillespie, am scribing for, and in the presence of,  YSOVANY Watson. I have scribed the following portions of the note - Other sections scribed: HPI, ROS, PE.       History     Chief Complaint   Patient presents with    Groin Pain     Patient c/o left groin pain that radiates to LLQ abd area with nausea x 2 hours.  Reports sudden onset that woke him from his sleep.  Denies hx of kidney stones.      Abdominal Pain    Nausea     This is a 20 y.o. male who presents to the ED complaining of 8/10 left testicular pain that started this morning. He reports that he woke up with the pain at 0450. He notes having associated lower abdominal pain, nausea, and left-sided chest pain. He states that the pain is worse with movement. He reports that this pain feels similar to when he had Epididymitis in the past. He denies any concern for STD exposure. He denies any known sick contact or exposure to COVID-19. He denies a PMHx of kidney stones.  Denies fever, chills, vomiting, diarrhea, constipation, penile discharge, penile pain, penile swelling, cough, SOB, dizziness, light-headedness, dysuria, hematuria, frequency, and urgency. No other associated symptoms.    The history is provided by the patient. No  was used.     Review of patient's allergies indicates:  No Known Allergies  History reviewed. No pertinent past medical history.  History reviewed. No pertinent surgical history.  History reviewed. No pertinent family history.  Social History     Tobacco Use    Smoking status: Current Every Day Smoker     Types: Vaping with nicotine   Substance Use Topics    Alcohol use: Never     Frequency: Never    Drug use: Never     Review of Systems   Constitutional: Negative for chills and fever.   HENT: Negative for sore throat.    Respiratory: Negative for cough and shortness of breath.    Cardiovascular: Positive for chest pain.   Gastrointestinal: Positive  for abdominal pain and nausea. Negative for constipation, diarrhea and vomiting.   Genitourinary: Positive for testicular pain. Negative for discharge, dysuria, frequency, hematuria, penile pain, penile swelling and urgency.   Musculoskeletal: Negative for back pain.   Skin: Negative for rash.   Neurological: Negative for dizziness, weakness and light-headedness.   Hematological: Does not bruise/bleed easily.       Physical Exam     Initial Vitals [07/21/20 0545]   BP Pulse Resp Temp SpO2   (!) 159/88 77 (!) 22 98.5 °F (36.9 °C) 99 %      MAP       --         Physical Exam    Nursing note and vitals reviewed.  Constitutional: He appears well-developed and well-nourished.  Non-toxic appearance. He does not have a sickly appearance. No distress.   HENT:   Head: Normocephalic.   Right Ear: External ear normal.   Left Ear: External ear normal.   Mouth/Throat: No trismus in the jaw.   Eyes: EOM are normal.   Neck: Normal range of motion. No tracheal deviation present.   Pulmonary/Chest: No tachypnea and no bradypnea. No respiratory distress.   Abdominal: Soft. He exhibits no distension. There is abdominal tenderness in the right lower quadrant and left lower quadrant. There is guarding. There is no CVA tenderness.   Genitourinary:    Penis normal.   Right testis shows no mass and no tenderness. Left testis shows tenderness. Left testis shows no mass.    Genitourinary Comments:  exam chaperoned by james Gillespie        Neurological: He is alert and oriented to person, place, and time. GCS score is 15. GCS eye subscore is 4. GCS verbal subscore is 5. GCS motor subscore is 6.   Skin: Skin is warm and dry. No rash noted.   Psychiatric: He has a normal mood and affect. His behavior is normal. Judgment and thought content normal.         ED Course   Procedures  Labs Reviewed   CBC W/ AUTO DIFFERENTIAL - Abnormal; Notable for the following components:       Result Value    Mean Corpuscular Hemoglobin 31.6 (*)     Gran%  0.0 (*)     Lymph% 0.0 (*)     Mono% 0.0 (*)     Eosinophil% 100.0 (*)     All other components within normal limits   COMPREHENSIVE METABOLIC PANEL - Abnormal; Notable for the following components:    Potassium 3.4 (*)     CO2 17 (*)     AST 54 (*)      (*)     All other components within normal limits   LIPASE   SARS-COV-2 RNA AMPLIFICATION, QUAL   URINALYSIS, REFLEX TO URINE CULTURE          Imaging Results           US Scrotum And Testicles (Final result)  Result time 07/21/20 08:04:58    Final result by Swapnil Pacheco MD (07/21/20 08:04:58)                 Impression:      There is no sonographically detectable flow of the left testicle this is consistent with testicular torsion of the left testicle.    Findings were reported to the patient's nurse in the ER at the time of dictation.    This report was flagged in Epic as abnormal.      Electronically signed by: Swapnil Pacheco  Date:    07/21/2020  Time:    08:04             Narrative:    EXAMINATION:  US SCROTUM AND TESTICLES    CLINICAL HISTORY:  Left testicular pain    TECHNIQUE:  Sonography of the scrotum and testes.    COMPARISON:  None.    FINDINGS:  The right testicle measures approximately 5.4 x 2.6 x 2.8 cm in size, it demonstrates appropriate echotexture without evidence for testicular mass or cystic collection.  On color Doppler imaging the right testicle demonstrates sonographic evidence for appropriate flow.  Mild peritesticular fluid on the right noted may be physiologic may relate to mild hydrocele.  The right epididymis appears unremarkable.    The left testicle measures approximately 3.0 x 2.9 x 5.4 cm in size it is relatively homogeneous and there is no left testicular mass or cystic lesion however on color Doppler imaging there is no sonographically detectable vascular flow of the left testicle most concerning for testicular torsion.  Mild peritesticular fluid on the left is noted, this may represent hydrocele.  Mild prominence of  the epididymis noted.                               X-Ray Chest AP Portable (Final result)  Result time 07/21/20 07:06:54    Final result by Swapnil Pacheco MD (07/21/20 07:06:54)                 Impression:      There is no radiographic evidence for acute intrathoracic process.      Electronically signed by: Swapnil Pacheco  Date:    07/21/2020  Time:    07:06             Narrative:    EXAMINATION:  XR CHEST AP PORTABLE    CLINICAL HISTORY:  Chest pain, unspecified    TECHNIQUE:  Single frontal view of the chest was performed.    COMPARISON:  None    FINDINGS:  Single portable chest view is submitted.  The cardiomediastinal silhouette appears appropriate.  There is no evidence for confluent infiltrate or consolidation, significant pleural effusion or pneumothorax.  The visualized osseous structures appear intact.                                 Medical Decision Making:   Initial Assessment:   20-year-old male presenting for evaluation of atraumatic left testicular pain that began this morning  Differential Diagnosis:   UTI epididymitis urethritis, nephrolithiasis, testicular torsion, acute abdomen among others  Clinical Tests:   Lab Tests: Ordered and Reviewed  Radiological Study: Ordered and Reviewed  ED Management:  Patient is afebrile, nontoxic appearing, appears uncomfortable due to pain.  Exam above.      Morphine IV given and zofran for nausea  US with testicular torsion. Urology paged. Last meal 1830 yesterday. 0500 last water intake  Spoke with Dr. Guillermo regarding patient who will come down to ED to evaluate the patient.   Patient taken to OR in stable condition for surgical management of testicular torsion.    Discussed patient with Dr. Milian who also evaluated pt face to face and agrees with assessment and plan.             Scribe Attestation:   Scribe #1: I performed the above scribed service and the documentation accurately describes the services I performed. I attest to the accuracy of the  note.                          Clinical Impression:       ICD-10-CM ICD-9-CM   1. Left testicular torsion  N44.00 608.20   2. Testicular pain, left  N50.812 608.9   3. Chest pain  R07.9 786.50   4. Testicular torsion  N44.00 608.20             ED Disposition Condition    Discharge Stable        ED Prescriptions     Medication Sig Dispense Start Date End Date Auth. Provider    HYDROcodone-acetaminophen (NORCO) 5-325 mg per tablet Take 1 tablet by mouth every 6 (six) hours as needed for Pain. 28 tablet 7/21/2020  KAITY Guillermo MD    cephALEXin (KEFLEX) 500 MG capsule Take 1 capsule (500 mg total) by mouth every 8 (eight) hours. for 5 days 15 capsule 7/21/2020 7/26/2020 KAITY Guillermo MD        Follow-up Information     Follow up With Specialties Details Why Contact Info    KAITY Guillermo MD Urology Schedule an appointment as soon as possible for a visit in 2 weeks Post op Check [address]  [address] [place] [address] [id number] [phone]                        Scribe Attestation: I, Cindy Witt PA-C , personally performed the services described in this documentation. All medical record entries made by the scribe were at my direction and in my presence. I have reviewed the chart and agree that the record reflects my personal performance and is accurate and complete.

## 2020-07-21 NOTE — OP NOTE
Date of Procedure: 07/21/2020     Preoperative Diagnosis:  Left testicular torsion    Postoperative Diagnosis:  Left testicular portion    Primary Surgeon: KAITY Guillermo MD    Anesthesia:  General endotracheal anesthesia    Procedure Performed:  Scrotal exploration bilateral orchiopexy    Estimated Blood Loss:  Minimal    Drains:  None    Specimens Removed:  None    Complications:  None    Indications: Vitor Douglas is a 20-year-old male who woke up this morning with left testicular pain.  Physical examination as well as ultrasound he had evidence of left testicular torsion.  His recommended he undergo emergent scrotal exploration with bilateral orchiopexy possible left orchiectomy.    Procedure in Detail:    Vitor Douglas was taken to the operating room where he was positively identified by susan.  He has placed supine the operating room table.  Following induction of adequate general anesthesia he was kept in supine position his external genitalia were clipped and prepped and draped in usual sterile fashion.    A preoperative time-out was performed as well as confirmation of preoperative antibiotics and preoperative marking left side.    A bilateral inguinal block was performed with 0.5% Marcaine plain mixed with 1% lidocaine plain.    Then made a 3 cm midline scrotal incision with a 15 blade scalpel.  We dissected through the dartos fascia focusing on the left hemiscrotum with electrocautery.  I then opened the tunica vaginalis.  The testicle was delivered through the opening.  The testicle was dusky in appearance.  I untwisted the testicle.  There is a 360 degree twist.  The testicle was then wrapped in a moist lap pad.    I then opened dartos fascia focusing on the right hemiscrotum.  I dissected to the tissues until I encountered the tunica vaginalis.  Tunica vaginalis was then opened and the right testicle was then delivered.  Testicle appeared normal.    I then created a pocket in  the right hemiscrotum with a blunt dissection.  Hemostasis was achieved using electrocautery.  I then placed sutures medially laterally and inferiorly within the scrotal wall with 4 Prolene suture.  I then placed the sutures at corresponding sites gently through the tunica albuginea of the right testicle.  I then secured the Prolene sutures.  This fixated the right testicle within the right hemiscrotum.    I then turned my attention back to the left testicle which was now pink and appeared viable.  I bluntly dissected a pocket within the left hemiscrotum.  I then placed 4 Prolene sutures medially laterally and inferiorly.  I then placed the sutures through the corresponding sites of the tunica albuginea of the left testicle.  I then secured the Prolene sutures.  This extended the left testicle within the left hemiscrotum.    Hemostasis was deemed adequate.    I then closed the dartos fascia with a 3 O chromic suture in a running locking fashion.    I then closed the skin with a 3 O chromic suture in a running locking fashion.  Collodion solution was applied to the incision.    Sponge counts needle counts instrument counts were reported correct at the end of the case.    His anesthesia was then reversed was taken to recovery room in stable condition.

## 2020-07-21 NOTE — TRANSFER OF CARE
"Anesthesia Transfer of Care Note    Patient: Vitor Douglas    Procedure(s) Performed: Procedure(s) (LRB):  REDUCTION, TORSION, TESTICLE (Left)  ORCHIOPEXY (Bilateral)  EXPLORATION, SCROTUM (Bilateral)    Patient location: PACU    Anesthesia Type: general    Transport from OR: Transported from OR on room air with adequate spontaneous ventilation    Post pain: adequate analgesia    Post assessment: no apparent anesthetic complications and tolerated procedure well    Post vital signs: stable    Level of consciousness: awake    Nausea/Vomiting: no nausea/vomiting    Complications: none    Transfer of care protocol was followed      Last vitals:   Visit Vitals  /82 (BP Location: Left arm, Patient Position: Lying)   Pulse 81   Temp 36.4 °C (97.5 °F) (Oral)   Resp 12   Ht 5' 9" (1.753 m)   Wt 81.6 kg (180 lb)   SpO2 100%   BMI 26.58 kg/m²     "

## 2020-07-21 NOTE — H&P
[facility]  Urology  History & Physical    Patient Name: Vitor Douglas  MRN: [id number]  Admission Date: 7/21/2020  Code Status: No Order   Attending Provider: Dorian Milian MD   Primary Care Physician: Eliseo Aguilar MD  Principal Problem:<principal problem not specified>    Subjective:     HPI:  Left Testicular Torsion  Vitor Douglas is a 20 y.o. male who woke up this morning at 0450 with left testicular pain.  He denies trauma, dysuria, hematuria, or previous torsion.  The pain has not improved since onset.    History reviewed. No pertinent past medical history.    History reviewed. No pertinent surgical history.    Review of patient's allergies indicates:  No Known Allergies    Family History     None          Tobacco Use    Smoking status: Current Every Day Smoker     Types: Vaping with nicotine   Substance and Sexual Activity    Alcohol use: Never     Frequency: Never    Drug use: Never    Sexual activity: Not Currently       Review of Systems   Constitutional: Positive for fever.   HENT: Negative.    Eyes: Negative.    Respiratory: Negative for cough, chest tightness and shortness of breath.    Cardiovascular: Negative for chest pain.   Gastrointestinal: Negative.  Negative for constipation, diarrhea and nausea.   Genitourinary: Positive for testicular pain. Negative for scrotal swelling.   Musculoskeletal: Negative.    Neurological: Negative.    Psychiatric/Behavioral: Negative.        Objective:     Temp:  [98.5 °F (36.9 °C)] 98.5 °F (36.9 °C)  Pulse:  [77] 77  Resp:  [16-22] 16  SpO2:  [99 %] 99 %  BP: (159)/(88) 159/88     Body mass index is 26.58 kg/m².    No intake/output data recorded.       Drains     None                 Physical Exam   Nursing note and vitals reviewed.  Constitutional: He is oriented to person, place, and time. He appears well-developed.   HENT:   Head: Normocephalic.   Eyes: Conjunctivae are normal.   Neck: Normal range of motion. Neck  supple. No tracheal deviation present. No thyromegaly present.   Cardiovascular: Normal rate and normal heart sounds.    Pulmonary/Chest: Effort normal and breath sounds normal. No respiratory distress. He has no wheezes.   Abdominal: Soft. Bowel sounds are normal. There is no abdominal tenderness. There is no rebound. No hernia. Hernia confirmed negative in the right inguinal area and confirmed negative in the left inguinal area.   Genitourinary: Right testis shows no mass, no swelling and no tenderness. Right testis is descended. Cremasteric reflex is not absent on the right side. Left testis shows tenderness. Left testis shows no mass and no swelling. Left testis is descended. Cremasteric reflex is absent on the left side. Uncircumcised.   Musculoskeletal: Normal range of motion. No tenderness.   Lymphadenopathy:     He has no cervical adenopathy.   Neurological: He is alert and oriented to person, place, and time.   Skin: Skin is warm and dry. No rash noted. No erythema.     Psychiatric: His behavior is normal. Judgment and thought content normal.       Significant Labs:    BMP:  Recent Labs   Lab 07/21/20  0626      K 3.4*      CO2 17*   BUN 17   CREATININE 1.1   CALCIUM 10.1       CBC:  Recent Labs   Lab 07/21/20  0626   WBC 7.37   HGB 15.6   HCT 45.0   PLT SEE COMMENT       Blood Culture: No results for input(s): LABBLOO in the last 168 hours.  Urine Culture: No results for input(s): LABURIN in the last 168 hours.    Significant Imaging:  U/S: I have reviewed all results within the past 24 hours and my personal findings are:      Contains abnormal data US Scrotum And Testicles  Order: [id number]  Status:  Final result   Visible to patient:  No (not released)   Next appt:  None   Dx:  Testicular pain, left  Details    Reading Physician Reading Date Result Priority   Swapnil Pacheco MD  [phone] 7/21/2020 STAT      Narrative & Impression     EXAMINATION:  US SCROTUM AND TESTICLES     CLINICAL  HISTORY:  Left testicular pain     TECHNIQUE:  Sonography of the scrotum and testes.     COMPARISON:  None.     FINDINGS:  The right testicle measures approximately 5.4 x 2.6 x 2.8 cm in size, it demonstrates appropriate echotexture without evidence for testicular mass or cystic collection.  On color Doppler imaging the right testicle demonstrates sonographic evidence for appropriate flow.  Mild peritesticular fluid on the right noted may be physiologic may relate to mild hydrocele.  The right epididymis appears unremarkable.     The left testicle measures approximately 3.0 x 2.9 x 5.4 cm in size it is relatively homogeneous and there is no left testicular mass or cystic lesion however on color Doppler imaging there is no sonographically detectable vascular flow of the left testicle most concerning for testicular torsion.  Mild peritesticular fluid on the left is noted, this may represent hydrocele.  Mild prominence of the epididymis noted.     Impression:     There is no sonographically detectable flow of the left testicle this is consistent with testicular torsion of the left testicle.     Findings were reported to the patient's nurse in the ER at the time of dictation.     This report was flagged in Epic as abnormal.        Electronically signed by: Swapnil Pacheco  Date:                                            07/21/2020  Time:                                           08:04            Last Resulted: 07/21/20 08:04                           Assessment and Plan:     Left testicular torsion  Consented and marked on the left side for scrotal exploration and bilateral orchiopexy with possible left orchiectomy.  Keep NPO  Cefazolin on call        VTE Risk Mitigation (From admission, onward)    None          ARTIE Guillermo MD  Urology  [facility]

## 2020-07-23 ENCOUNTER — TELEPHONE (OUTPATIENT)
Dept: UROLOGY | Facility: CLINIC | Age: 20
End: 2020-07-23

## 2020-07-23 NOTE — TELEPHONE ENCOUNTER
----- Message from Marta Luther sent at 7/23/2020  9:23 AM CDT -----  Regarding: medical advice  Type: Patient Call Back    Who called:Vitor     What is the request in detail: The patient is requesting a call back from the staff with some medical advice. He has not had a bowel movement since his surgery and wants to know if he can take a laxative. He has magnesium citrate and wants to know if he can take it.    Can the clinic reply by [facility]?no    Would the patient rather a call back or a response via [facility]? Call back     Best call back number:[phone]

## 2020-07-26 LAB
BACTERIA BLD CULT: NORMAL
BACTERIA BLD CULT: NORMAL

## 2020-08-04 ENCOUNTER — OFFICE VISIT (OUTPATIENT)
Dept: UROLOGY | Facility: CLINIC | Age: 20
End: 2020-08-04
Payer: OTHER GOVERNMENT

## 2020-08-04 VITALS
HEIGHT: 69 IN | BODY MASS INDEX: 26.97 KG/M2 | WEIGHT: 182.13 LBS | SYSTOLIC BLOOD PRESSURE: 121 MMHG | DIASTOLIC BLOOD PRESSURE: 70 MMHG

## 2020-08-04 DIAGNOSIS — N44.00 LEFT TESTICULAR TORSION: Primary | ICD-10-CM

## 2020-08-04 PROCEDURE — 99024 POSTOP FOLLOW-UP VISIT: CPT | Mod: ,,, | Performed by: NURSE PRACTITIONER

## 2020-08-04 PROCEDURE — 99213 OFFICE O/P EST LOW 20 MIN: CPT | Mod: PBBFAC | Performed by: NURSE PRACTITIONER

## 2020-08-04 PROCEDURE — 99999 PR PBB SHADOW E&M-EST. PATIENT-LVL III: ICD-10-PCS | Mod: PBBFAC,,, | Performed by: NURSE PRACTITIONER

## 2020-08-04 PROCEDURE — 99024 PR POST-OP FOLLOW-UP VISIT: ICD-10-PCS | Mod: ,,, | Performed by: NURSE PRACTITIONER

## 2020-08-04 PROCEDURE — 99999 PR PBB SHADOW E&M-EST. PATIENT-LVL III: CPT | Mod: PBBFAC,,, | Performed by: NURSE PRACTITIONER

## 2020-08-04 NOTE — PROGRESS NOTES
Subjective:       Patient ID: Vitor Douglas is a 20 y.o. male who is an established patient of Dr Guillermo though new to me was last seen in this office Visit date not found    Chief Complaint:   Chief Complaint   Patient presents with    Other     Pt. is here for follow up after procedure .. states he has been feeling fine .. no pain as of now      Testicular Torsion  Patient presented to the ED on 7/21/20 with c/o acute onset of left testicular pain. There was evidence of left testicular torsion noted on physical examination as well as ultrasound. Subsequently he underwent emergent scrotal exploration with bilateral orchiopexy by Dr Guillermo on 7/21/20    He is here today for follow up. He is not having any testicular pain. Initially he had issues with constipation post operatively but this has since resolved. Last BM on yesterday. Overall he feels well. No new complaints    ACTIVE MEDICAL ISSUES:  Patient Active Problem List   Diagnosis    Left testicular torsion       ALLERGIES AND MEDICATIONS: updated and reviewed.  Review of patient's allergies indicates:  No Known Allergies  Current Outpatient Medications   Medication Sig    HYDROcodone-acetaminophen (NORCO) 5-325 mg per tablet Take 1 tablet by mouth every 6 (six) hours as needed for Pain.     No current facility-administered medications for this visit.        Review of Systems   Constitutional: Negative for activity change, chills, fatigue, fever and unexpected weight change.   Eyes: Negative for discharge, redness and visual disturbance.   Respiratory: Negative for cough, shortness of breath and wheezing.    Cardiovascular: Negative for chest pain and leg swelling.   Gastrointestinal: Negative for abdominal distention, abdominal pain, constipation, diarrhea, nausea and vomiting.   Genitourinary: Negative for decreased urine volume, difficulty urinating, discharge, dysuria, flank pain, frequency, hematuria, penile pain, penile swelling, scrotal  "swelling, testicular pain and urgency.   Musculoskeletal: Negative for arthralgias, joint swelling and myalgias.   Skin: Negative for color change and rash.   Neurological: Negative for dizziness and light-headedness.   Psychiatric/Behavioral: Negative for behavioral problems and confusion. The patient is not nervous/anxious.        Objective:      Vitals:    08/04/20 1003   BP: 121/70   Weight: 82.6 kg (182 lb 1.6 oz)   Height: 5' 9" (1.753 m)     Physical Exam   Nursing note and vitals reviewed.  Constitutional: He is oriented to person, place, and time. He appears well-developed.   HENT:   Head: Normocephalic and atraumatic.   Nose: Nose normal.   Eyes: Conjunctivae are normal. Right eye exhibits no discharge. Left eye exhibits no discharge.   Neck: Normal range of motion. Neck supple. No tracheal deviation present. No thyromegaly present.   Cardiovascular: Normal rate and regular rhythm.    Pulmonary/Chest: Effort normal. No stridor. No respiratory distress. He has no wheezes.   Abdominal: Soft. He exhibits no distension. There is no abdominal tenderness. No hernia. Hernia confirmed negative in the right inguinal area and confirmed negative in the left inguinal area.   Genitourinary:    Testes and penis normal.   Right testis shows no mass, no swelling and no tenderness. Left testis shows no mass, no swelling and no tenderness. Uncircumcised. No phimosis, penile erythema or penile tenderness. No discharge found.    Genitourinary Comments: Bilateral testicles noted in appropriate anatomical position     Musculoskeletal: Normal range of motion.   Neurological: He is alert and oriented to person, place, and time.   Skin: Skin is warm and dry. No rash noted. No erythema.     Psychiatric: His behavior is normal. Judgment normal.       Urine dipstick shows patient unable to produce specimen.     Assessment:       1. Left testicular torsion          Plan:       1. Left testicular torsion  -s/p bilateral orchipexy on " 7/21/20 with Dr Guillermo--doing well  - US Scrotum And Testicles; Future  - POCT urinalysis, dipstick or tablet reag            Follow up in about 2 months (around 10/4/2020) for Follow up.

## 2020-09-02 ENCOUNTER — HOSPITAL ENCOUNTER (OUTPATIENT)
Dept: RADIOLOGY | Facility: HOSPITAL | Age: 20
Discharge: HOME | End: 2020-09-02
Attending: NURSE PRACTITIONER
Payer: OTHER GOVERNMENT

## 2020-09-02 DIAGNOSIS — N44.00 LEFT TESTICULAR TORSION: ICD-10-CM

## 2020-09-02 PROCEDURE — 76870 US EXAM SCROTUM: CPT | Mod: 26,,, | Performed by: RADIOLOGY

## 2020-09-02 PROCEDURE — 76870 US EXAM SCROTUM: CPT | Mod: TC

## 2020-09-02 PROCEDURE — 76870 US SCROTUM AND TESTICLES: ICD-10-PCS | Mod: 26,,, | Performed by: RADIOLOGY

## 2020-09-10 ENCOUNTER — OFFICE VISIT (OUTPATIENT)
Dept: UROLOGY | Facility: CLINIC | Age: 20
End: 2020-09-10
Payer: OTHER GOVERNMENT

## 2020-09-10 VITALS
SYSTOLIC BLOOD PRESSURE: 121 MMHG | WEIGHT: 182 LBS | DIASTOLIC BLOOD PRESSURE: 70 MMHG | BODY MASS INDEX: 26.96 KG/M2 | HEIGHT: 69 IN

## 2020-09-10 DIAGNOSIS — N44.00 LEFT TESTICULAR TORSION: Primary | ICD-10-CM

## 2020-09-10 LAB
BILIRUB SERPL-MCNC: NORMAL MG/DL
BLOOD URINE, POC: NORMAL
COLOR, POC UA: YELLOW
GLUCOSE UR QL STRIP: NORMAL
KETONES UR QL STRIP: NORMAL
LEUKOCYTE ESTERASE URINE, POC: NORMAL
NITRITE, POC UA: NORMAL
PH, POC UA: 6
PROTEIN, POC: NORMAL
SPECIFIC GRAVITY, POC UA: 1015
UROBILINOGEN, POC UA: NORMAL

## 2020-09-10 PROCEDURE — 81001 URINALYSIS AUTO W/SCOPE: CPT | Mod: PBBFAC | Performed by: NURSE PRACTITIONER

## 2020-09-10 PROCEDURE — 99999 PR PBB SHADOW E&M-EST. PATIENT-LVL III: CPT | Mod: PBBFAC,,, | Performed by: NURSE PRACTITIONER

## 2020-09-10 PROCEDURE — 99999 PR PBB SHADOW E&M-EST. PATIENT-LVL III: ICD-10-PCS | Mod: PBBFAC,,, | Performed by: NURSE PRACTITIONER

## 2020-09-10 PROCEDURE — 99213 PR OFFICE/OUTPT VISIT, EST, LEVL III, 20-29 MIN: ICD-10-PCS | Mod: S$PBB,,, | Performed by: NURSE PRACTITIONER

## 2020-09-10 PROCEDURE — 99213 OFFICE O/P EST LOW 20 MIN: CPT | Mod: S$PBB,,, | Performed by: NURSE PRACTITIONER

## 2020-09-10 PROCEDURE — 99213 OFFICE O/P EST LOW 20 MIN: CPT | Mod: PBBFAC | Performed by: NURSE PRACTITIONER

## 2020-09-10 NOTE — PROGRESS NOTES
Subjective:       Patient ID: Vitor Douglas is a 20 y.o. male who was last seen in this office 8/4/2020    Chief Complaint:   Chief Complaint   Patient presents with    Follow-up     Pt. is here for US results.. states no new issues        Testicular Torsion  Patient presented to the ED on 7/21/20 with c/o acute onset of left testicular pain. There was evidence of left testicular torsion noted on physical examination as well as ultrasound. Subsequently he underwent emergent scrotal exploration with bilateral orchiopexy by Dr Guillermo on 7/21/20    He is not having any testicular pain. Initially he had issues with constipation post operatively but this has since resolved.     He returns today with a scrotal ultrasound.  No new complaints        ACTIVE MEDICAL ISSUES:  Patient Active Problem List   Diagnosis    Left testicular torsion       ALLERGIES AND MEDICATIONS: updated and reviewed.  Review of patient's allergies indicates:  No Known Allergies  Current Outpatient Medications   Medication Sig    HYDROcodone-acetaminophen (NORCO) 5-325 mg per tablet Take 1 tablet by mouth every 6 (six) hours as needed for Pain.     No current facility-administered medications for this visit.        Review of Systems   Constitutional: Negative for activity change, chills, fatigue, fever and unexpected weight change.   Eyes: Negative for discharge, redness and visual disturbance.   Respiratory: Negative for cough, shortness of breath and wheezing.    Cardiovascular: Negative for chest pain and leg swelling.   Gastrointestinal: Negative for abdominal distention, abdominal pain, constipation, diarrhea, nausea and vomiting.   Genitourinary: Negative for decreased urine volume, difficulty urinating, dysuria, flank pain, frequency, hematuria, scrotal swelling, testicular pain and urgency.   Musculoskeletal: Negative for arthralgias, joint swelling and myalgias.   Skin: Negative for color change and rash.   Neurological:  "Negative for dizziness and light-headedness.   Psychiatric/Behavioral: Negative for behavioral problems and confusion. The patient is not nervous/anxious.        Objective:      Vitals:    09/10/20 0924   BP: 121/70   Weight: 82.6 kg (182 lb)   Height: 5' 9" (1.753 m)          Physical Exam   Constitutional: He is oriented to person, place, and time. He appears well-developed.   HENT:   Head: Normocephalic and atraumatic.   Nose: Nose normal.   Eyes: Conjunctivae are normal. Right eye exhibits no discharge. Left eye exhibits no discharge.   Neck: Normal range of motion. Neck supple. No tracheal deviation present. No thyromegaly present.   Cardiovascular: Normal rate and regular rhythm.    Pulmonary/Chest: Effort normal. No respiratory distress. He has no wheezes.   Abdominal: Soft. He exhibits no distension. There is no abdominal tenderness. No hernia.   Genitourinary:    Genitourinary Comments: Patient declined exam     Musculoskeletal: Normal range of motion.   Neurological: He is alert and oriented to person, place, and time.   Skin: Skin is warm and dry. No rash noted. No erythema.     Psychiatric: His behavior is normal. Judgment normal.       Urine dipstick shows negative for all components.  Micro exam: negative for WBC's or RBC's.    Narrative & Impression    EXAMINATION:  US SCROTUM AND TESTICLES     CLINICAL HISTORY:  Torsion of testis, unspecified     TECHNIQUE:  Sonography of the scrotum and testes.     COMPARISON:  None.     FINDINGS:  Right Testicle:     *Size: 5.2 x 2.7 x 3.1 cm  *Appearance: Normal.  *Flow: Normal arterial and venous flow  *Epididymis: Normal.  *Hydrocele: None.  *Varicocele: None.  .     Left Testicle:     *Size: 5.2 x 2.6 x 2.8 cm  *Appearance: Normal.  *Flow: Normal arterial and venous flow  *Epididymis: Normal.  *Hydrocele: None.  *Varicocele: None.  .     Other findings: None.     Impression:     No significant sonographic abnormalities.  Normal vascularity of the left testicle " consistent with previous torsion reduction and subsequent orchiopexy.        Electronically signed by: Shamar Villafuerte MD  Date:                                            09/02/2020  Time:                                           13:48    Encounter            Reviewed with patient    Assessment:       1. Left testicular torsion          Plan:       1. Left testicular torsion  -s/p bilateral orchipexy on 7/21/20 with Dr Guillermo--doing well  -MARCELINA--normal  - POCT urinalysis, dipstick or tablet reag            Follow up in about 6 months (around 3/10/2021) for Follow up.

## 2020-09-12 ENCOUNTER — HOSPITAL ENCOUNTER (EMERGENCY)
Facility: HOSPITAL | Age: 20
Discharge: HOME | End: 2020-09-12
Attending: EMERGENCY MEDICINE
Payer: OTHER GOVERNMENT

## 2020-09-12 VITALS
DIASTOLIC BLOOD PRESSURE: 63 MMHG | HEIGHT: 69 IN | SYSTOLIC BLOOD PRESSURE: 136 MMHG | HEART RATE: 76 BPM | RESPIRATION RATE: 20 BRPM | BODY MASS INDEX: 27.4 KG/M2 | OXYGEN SATURATION: 99 % | TEMPERATURE: 98 F | WEIGHT: 185 LBS

## 2020-09-12 DIAGNOSIS — Z13.9 ENCOUNTER FOR MEDICAL SCREENING EXAMINATION: Primary | ICD-10-CM

## 2020-09-12 LAB
CTP QC/QA: YES
SARS-COV-2 RDRP RESP QL NAA+PROBE: NEGATIVE

## 2020-09-12 PROCEDURE — 99282 EMERGENCY DEPT VISIT SF MDM: CPT

## 2020-09-12 PROCEDURE — U0002 COVID-19 LAB TEST NON-CDC: HCPCS | Performed by: PHYSICIAN ASSISTANT

## 2020-09-12 NOTE — ED PROVIDER NOTES
Encounter Date: 9/12/2020       History     Chief Complaint   Patient presents with    Check-up     Pt states he needs covid-19 test because he is getting sent off for  obligations. Denies symptoms at this time     20-year-old male smoker with chief complaint need for COVID testing.  Patient is to be deployed tomorrow, unfortunately was unable to get his  to sign the paperwork in time.  [place] for the [place].  No complaints.        Review of patient's allergies indicates:  No Known Allergies  History reviewed. No pertinent past medical history.  Past Surgical History:   Procedure Laterality Date    ORCHIOPEXY Bilateral 7/21/2020    Procedure: ORCHIOPEXY;  Surgeon: KAITY Guillermo MD;  Location: [facility] OR;  Service: Urology;  Laterality: Bilateral;    REDUCTION OF TESTICULAR TORSION Left 7/21/2020    Procedure: REDUCTION, TORSION, TESTICLE;  Surgeon: KAITY Guillermo MD;  Location: [facility] OR;  Service: Urology;  Laterality: Left;    SCROTUM EXPLORATION Bilateral 7/21/2020    Procedure: EXPLORATION, SCROTUM;  Surgeon: KAITY Guillermo MD;  Location: [facility] OR;  Service: Urology;  Laterality: Bilateral;     History reviewed. No pertinent family history.  Social History     Tobacco Use    Smoking status: Current Every Day Smoker     Types: Vaping with nicotine   Substance Use Topics    Alcohol use: Never     Frequency: Never    Drug use: Never     Review of Systems   Constitutional: Negative for chills and fever.   Respiratory: Negative for shortness of breath.    Cardiovascular: Negative for chest pain.   Gastrointestinal: Negative for abdominal pain.   Musculoskeletal: Negative for myalgias, neck pain and neck stiffness.   Neurological: Negative for headaches.       Physical Exam     Initial Vitals [09/12/20 1731]   BP Pulse Resp Temp SpO2   (!) 140/77 75 18 98.5 °F (36.9 °C) 97 %      MAP       --         Physical Exam    Nursing note and  vitals reviewed.  Constitutional: He appears well-developed and well-nourished. He is not diaphoretic. No distress.   HENT:   Head: Normocephalic and atraumatic.   Eyes: EOM are normal.   Neck: Neck supple.   Pulmonary/Chest: No respiratory distress.   Musculoskeletal: Normal range of motion.   Neurological: He is alert and oriented to person, place, and time. GCS score is 15. GCS eye subscore is 4. GCS verbal subscore is 5. GCS motor subscore is 6.   Skin: Skin is warm.         ED Course   Procedures  Labs Reviewed   SARS-COV-2 RNA AMPLIFICATION, QUAL   SARS-COV-2 RDRP GENE          Imaging Results    None          Medical Decision Making:   Initial Assessment:   20-year-old male with need for rapid COVID testing, he is being deployed tomorrow.  Differential Diagnosis:   Viral illness, COVID infection, medical screening  ED Management:  I feel amenable to rapid testing in this member of the  with no complaints.                             Clinical Impression:       ICD-10-CM ICD-9-CM   1. Encounter for medical screening examination  Z13.9 V82.9         Disposition:   Disposition: Discharged  Condition: Stable     ED Disposition Condition    Discharge Stable        ED Prescriptions     None        Follow-up Information    None                                      Elias Lawrence PA-C  09/13/20 0410

## 2020-09-12 NOTE — ED TRIAGE NOTES
Patient states he needs rapid covid testing to be cleared for  deployment on tomorrow. Had test done on 9/8/20 at the [facility] which shows a negative result, but results cannot be accepted by  because it wasn't done at a medical facility. Patient without any symptoms or problems at present time.

## 2021-03-10 ENCOUNTER — OFFICE VISIT (OUTPATIENT)
Dept: UROLOGY | Facility: CLINIC | Age: 21
End: 2021-03-10
Payer: OTHER GOVERNMENT

## 2021-03-10 VITALS — WEIGHT: 185.88 LBS | HEIGHT: 69 IN | BODY MASS INDEX: 27.53 KG/M2

## 2021-03-10 DIAGNOSIS — N44.00 LEFT TESTICULAR TORSION: Primary | ICD-10-CM

## 2021-03-10 PROCEDURE — 99213 PR OFFICE/OUTPT VISIT, EST, LEVL III, 20-29 MIN: ICD-10-PCS | Mod: S$PBB,,, | Performed by: UROLOGY

## 2021-03-10 PROCEDURE — 99999 PR PBB SHADOW E&M-EST. PATIENT-LVL III: CPT | Mod: PBBFAC,,, | Performed by: UROLOGY

## 2021-03-10 PROCEDURE — 99213 OFFICE O/P EST LOW 20 MIN: CPT | Mod: PBBFAC | Performed by: UROLOGY

## 2021-03-10 PROCEDURE — 99213 OFFICE O/P EST LOW 20 MIN: CPT | Mod: S$PBB,,, | Performed by: UROLOGY

## 2021-03-10 PROCEDURE — 99999 PR PBB SHADOW E&M-EST. PATIENT-LVL III: ICD-10-PCS | Mod: PBBFAC,,, | Performed by: UROLOGY

## 2022-01-18 ENCOUNTER — LAB VISIT (OUTPATIENT)
Dept: PRIMARY CARE CLINIC | Facility: OTHER | Age: 22
End: 2022-01-18
Attending: INTERNAL MEDICINE
Payer: OTHER GOVERNMENT

## 2022-01-18 DIAGNOSIS — Z20.822 ENCOUNTER FOR LABORATORY TESTING FOR COVID-19 VIRUS: ICD-10-CM

## 2022-01-18 PROCEDURE — U0003 INFECTIOUS AGENT DETECTION BY NUCLEIC ACID (DNA OR RNA); SEVERE ACUTE RESPIRATORY SYNDROME CORONAVIRUS 2 (SARS-COV-2) (CORONAVIRUS DISEASE [COVID-19]), AMPLIFIED PROBE TECHNIQUE, MAKING USE OF HIGH THROUGHPUT TECHNOLOGIES AS DESCRIBED BY CMS-2020-01-R: HCPCS | Performed by: INTERNAL MEDICINE

## 2022-01-19 LAB
SARS-COV-2 RNA RESP QL NAA+PROBE: NOT DETECTED
SARS-COV-2- CYCLE NUMBER: NORMAL

## (undated) DEVICE — GLOVE, SURG,LATEX FREE SZ 7

## (undated) DEVICE — GAUGE FLUFF X-SUPER 36X36 2PLY

## (undated) DEVICE — SUSPENSORY LG

## (undated) DEVICE — SOL 9P NACL IRR PIC IL

## (undated) DEVICE — SUT CHROMIC 3-0 SH 27IN GUT

## (undated) DEVICE — BLANKET UPPER BODY 78.7X29.9IN

## (undated) DEVICE — SEE MEDLINE ITEM 152622

## (undated) DEVICE — Device

## (undated) DEVICE — ELECTRODE REM PLYHSV RETURN 9

## (undated) DEVICE — SEE MEDLINE ITEM 154981

## (undated) DEVICE — GLOVE SURG BIOGEL LATEX SZ 7.5

## (undated) DEVICE — SUSPENSORY X-LARGE

## (undated) DEVICE — NDL HYPO REG 25G X 1 1/2

## (undated) DEVICE — CLIPPER BLADE MOD 4406 (CAREF)

## (undated) DEVICE — SUT 0 18IN SILK BLK BRAIDE

## (undated) DEVICE — GOWN B1 X-LG X-LONG

## (undated) DEVICE — GLOVE BIOGEL ECLIPSE SZ 7

## (undated) DEVICE — ELECTRODE BLD 1 INCH TEFLON

## (undated) DEVICE — PACK ENDOSCOPY GENERAL

## (undated) DEVICE — SUT PROLENE 4-0 RB-1 BL MO

## (undated) DEVICE — SUT SILK 0 BLK BR CT-1 30IN

## (undated) DEVICE — SYR 10CC LUER LOCK

## (undated) DEVICE — GAUZE SPONGE 4X4 12PLY